# Patient Record
Sex: MALE
[De-identification: names, ages, dates, MRNs, and addresses within clinical notes are randomized per-mention and may not be internally consistent; named-entity substitution may affect disease eponyms.]

---

## 2020-04-23 PROBLEM — Z00.00 ENCOUNTER FOR PREVENTIVE HEALTH EXAMINATION: Status: ACTIVE | Noted: 2020-04-23

## 2020-04-24 ENCOUNTER — APPOINTMENT (OUTPATIENT)
Dept: DISASTER EMERGENCY | Facility: CLINIC | Age: 65
End: 2020-04-24
Payer: MEDICAID

## 2020-04-24 VITALS
SYSTOLIC BLOOD PRESSURE: 150 MMHG | HEART RATE: 80 BPM | DIASTOLIC BLOOD PRESSURE: 90 MMHG | OXYGEN SATURATION: 98 % | TEMPERATURE: 98.8 F | RESPIRATION RATE: 16 BRPM

## 2020-04-24 DIAGNOSIS — R68.89 OTHER GENERAL SYMPTOMS AND SIGNS: ICD-10-CM

## 2020-04-24 LAB — SARS-COV-2 N GENE NPH QL NAA+PROBE: NOT DETECTED

## 2020-04-24 PROCEDURE — 99202 OFFICE O/P NEW SF 15 MIN: CPT

## 2020-04-24 NOTE — HISTORY OF PRESENT ILLNESS
[Patient presents to the office today for COVID-19 evaluation and testing.] : Patient presents to the office today for COVID-19 evaluation and testing. [Patient has been pre-screened by RN at call center for appointment today with our facility.] : Patient has been pre-screened by RN at call center for appointment today with our facility. [] : mild dizziness on standing [None Known] : none known [Age >= 60 years] : age >= 60 years [Heart Disease] : heart disease [Clear] : clear [None] : none [Speaks in full sentences] : speaks in full sentences [Good Air Entry] : good air entry [Normal O2 sat at rest] : normal O2 sat at rest [Grossly normal, interacts, not tired or weak] : grossly normal, interacts, not tired or weak [COVID-19 testing ordered and specimen obtained] : COVID-19 testing ordered and specimen obtained [Discharged with current Quarantine instructions and advised of signs of worsening illness.] : Patient discharged with current quarantine instructions and advised of signs of worsening illness. Patient told to seek emergent care if symptoms occur. [FreeTextEntry1] : 65 y/o  male with PMH of HTN, HLD on medication presents c/o symptoms below x 1 month. States he is currently on antibiotics for dental infection. Patient speaks some English, but more comfortable speaking Turkish. Provider conducted visit in Turkish [TextBox_48] : severe tooth-ache (non-contributory) [TextBox_66] : HTN, HLD